# Patient Record
(demographics unavailable — no encounter records)

---

## 2024-10-10 NOTE — HISTORY OF PRESENT ILLNESS
[FreeTextEntry1] : 76 yo male with BPH and lower urinary tract symptoms. He previously went to the ED for urinary retention s/p roberts placement. He is currently on alpha blockade and 5 alpha reductase inhibitors. PSA 2/2023 - 2.27  Office visit 4/1/24: Pt previously had UDS performed on 3/2024 which showed that he was unable to void with peak detrusor of 45cm of water. W intermediate complicance. Bladder neck did not open. Small diverticulae noted. Pt w no new acute complaints. Roberts draining clear urine.  Clinic visit 4/29/24: Taking Flomax and finasteride No issues with Roberts catheter. Has complaints of decreased mobility and difficulty ambulating since January despite working with PT and OT. He ambulates in clinic with a cane today.  Office visit 5/31/24 Pt presents today to reassess his symptoms. Has been taking Flomax and finasteride daily. Foly catheter has been draining clear yellow urine. The patient denies having any fevers, chills, nausea, vomiting, flank/abdominal pain or any irritative voiding symptoms.  Office visit 8/16/24 Pt is s/p TURP on 8/13/24. He was discharged POD1 with roberts catheter in place. Pt has been taking ppx abx as prescribed. Roberts draining clear yellow urine. Denies any fevers or chills. Pathology - Prostate chip pathology - BPH with glandular and fibromuscular hyperplasia and foci of chronic prostatitis, 17g resected  Office Visit 08/30/2024 Pt reports that he went into urinary retention 24hrs after his roberts was removed two weeks ago and had to go to ER to have his roberts replaced. Prior to his Roberts being replaced he reports voiding at least on 3 separate occasions. However when he fell asleep later in the day and woke up he was found to be in retention and called the emergency hotline when he was taken to the ER for Roberts placement overnight. Today patient's Roberts catheter was actively filled with sterile saline until he had the urge to void after which the catheter was removed and patient subsequently voided with a PVR of 0 cc.  Office Visit 10/10/2024  Pt reports no new complaints.  He is very happy since he has been catheter free for over 6 weeks now voiding freely.  Does have some intermittent symptoms of urinary urgency and urge incontinence but he wears a diaper for this and it is not bothersome.  Denies any symptoms of fevers dysuria or gross hematuria.

## 2024-10-10 NOTE — ASSESSMENT
[FreeTextEntry1] : #BPH/LUTS - urinary urgency- UDS showed bladder contractility w peak pressure of 45cm of water, pt could not void, bladder neck did not funnel open. Now S/p TURP on 8/13/24. Explained to patient that given his equivocal UDS findings and hx of stroke, there is a chance that despite having a TURP procedure to relieve his bladder outlet obstruction he may still be unable to void given his poor detrusor activity/possible NGB. Given patients neurologic deficits, he is not a candidate for self-intermittent catheterization. If he goes into urinary retention again we will forgo any further TOVs and plan for repeat UDS testing. - C/w Flomax - C/w Finasteride - Bladder US in 3 months to assess PVR, pt was unable to void today  Discussed behavioral modifications as previously documented. Also discussed medications adverse effects: 1) Alpha blocker - SE's include light headedness, orthostasis, reduction in volume of ejaculation, floppy iris syndrome 2) 5ARI - SE's include gynecomastia, erectile dysfunction, hypothetical risk of high grade CaP

## 2024-10-10 NOTE — PLAN

## 2024-10-11 NOTE — PHYSICAL EXAM
[No Acute Distress] : no acute distress [Normal Sclera/Conjunctiva] : normal sclera/conjunctiva [PERRL] : pupils equal round and reactive to light [Normal Outer Ear/Nose] : the outer ears and nose were normal in appearance [No Respiratory Distress] : no respiratory distress  [No Accessory Muscle Use] : no accessory muscle use [Clear to Auscultation] : lungs were clear to auscultation bilaterally [Normal Rate] : normal rate  [Regular Rhythm] : with a regular rhythm [Normal S1, S2] : normal S1 and S2 [Soft] : abdomen soft [Non Tender] : non-tender [Non-distended] : non-distended [Normal Bowel Sounds] : normal bowel sounds [No CVA Tenderness] : no CVA  tenderness [No Spinal Tenderness] : no spinal tenderness [Normal] : heart rate was normal and rhythm regular, normal S1 and S2, no murmurs [Bowel Sounds] : normal bowel sounds [Abdomen Tenderness] : non-tender [No Masses] : no abdominal mass palpated [Abdomen Soft] : soft [Oriented To Time, Place, And Person] : oriented to person, place, and time [de-identified] : l sided hemiparesis 2/2 lacunar stroke

## 2024-10-11 NOTE — ASSESSMENT
[FreeTextEntry1] : 76 yo M with pmhx of l/lacunar stroke (2012 with residual l sided hemiparesis), HTN, pre-DM, HLD, and chronic urinary retention (chronic Ellington) s/p cystoscopy/TURP presented to the clinic to establish care.  #Unexplained weight loss # Loss of appetite - schedule for EGD and colonoscopy - Risks and benefits reviewed and procedure explained to the patient. -  liquid diet 1 day prior  - Start drinking golytely at 2pm on the day prior to the procedure - 4 tabs of Dulcolax after goltelty on the day prior to the procedure - NPO after midnight  IF these procedures were unrevealing of the etiology, consider obtaining gastric emptying study.

## 2024-10-11 NOTE — REVIEW OF SYSTEMS
[Muscle Weakness] : muscle weakness [As Noted in HPI] : as noted in HPI [Limb Weakness] : limb weakness [Negative] : Endocrine [Diarrhea] : no diarrhea [Melena (black stool)] : no melena [Fecal Incontinence (soiling)] : no fecal incontinence [Bloating (gassiness)] : no bloating [FreeTextEntry7] : occasional constipation [de-identified] : Left sided weakness [Fever] : no fever [Chills] : no chills [Night Sweats] : no night sweats [Discharge] : no discharge [Pain] : no pain [Vision Problems] : no vision problems [Itching] : no itching [Chest Pain] : no chest pain [Palpitations] : no palpitations [Shortness Of Breath] : no shortness of breath [Wheezing] : no wheezing [Cough] : no cough [Abdominal Pain] : no abdominal pain [Nausea] : no nausea [Constipation] : no constipation [Vomiting] : no vomiting [Heartburn] : no heartburn [Melena] : no melena [Dysuria] : no dysuria [Incontinence] : no incontinence [Hematuria] : no hematuria [Frequency] : no frequency [Joint Pain] : no joint pain [Muscle Pain] : no muscle pain [Back Pain] : no back pain [Itching] : no itching

## 2024-11-20 NOTE — ASSESSMENT
[FreeTextEntry1] : - Pt seen and evaluated - nails debrided x10 with sterile nail ni-ppers - subungual hematoma left hallux, appears stable, will dissolve spontaneously  - RTC in 3 months for nail care or PRN

## 2024-11-20 NOTE — HISTORY OF PRESENT ILLNESS
[Wheelchair] : a wheelchair [FreeTextEntry1] : Home aid discovered a hematoma underneath the hallux, left foot about 3 months ago, not painful  B/L LE weakness Difficulty walking

## 2024-11-20 NOTE — PHYSICAL EXAM
[Ankle Swelling Bilaterally] : bilaterally  [1+] : left foot dorsalis pedis 1+ [Pes Planus] : pes planus deformity [Vibration Dec.] : diminished vibratory sensation at the level of the toes [Oriented To Time, Place, And Person] : oriented to person, place, and time [Ankle Swelling (On Exam)] : not present [Varicose Veins Of Lower Extremities] : not present [] : not present [FreeTextEntry1] : Elongated nails, subungual hematoma, Left hallux  [Diminished Throughout Right Foot] : normal sensation with monofilament testing throughout right foot [Diminished Throughout Left Foot] : normal sensation with monofilament testing throughout left foot

## 2024-11-26 NOTE — PHYSICAL EXAM
[FreeTextEntry1] : NEURO:  MENTAL STATUS: AAOx3  LANG/SPEECH: Fluent, intact naming, repetition & comprehension  CRANIAL NERVES:  II: Pupils equal and reactive, no RAPD, Intact visual field  III, IV, VI: mild restriction on vertical gaze on the R eye, no gaze preference or deviation  V: intact V1-2-3 distribution.  VII: no facial asymmetry  VIII: intact hearing to speech  MOTOR: Neck flexion 4-, neck extension 4+, b/l shoulder abduction/adduction/flexion 2+, R biceps 4+, left biceps 3/5, triceps 4/5 b/l, bl wrist flexion/extension 4/4. 4+/5 b/l knee Flexion/Extension. 3/5 R dorsiflexion, 4+/5 R plantar flexion. 4+ dorsi/plantar flexion on the L. 4- b/l Hip flexion. No bradykinesia (finger tap, alternating movements). Normal tone, no increased rigidity.  Diffuse muscle atrophy.  REFLEXES: 2+ b/l upper extremities. Hubbard neg b/l. Brisk patellar on the R 2+, 3+ patellar on the L  SENSORY: intact to touch in all extremities  COORD: intact finger to nose, no tremor, no dysmetria.

## 2024-11-26 NOTE — ASSESSMENT
[FreeTextEntry1] : 76 y/o male with a PMHx of L lacunar stroke (2012 w/ mild residual R sided hemiparesis), HTN, Pre-DM, HLD, chronic urinary retention (chronic Ell[place]), s/p prostate surgery w subacute onset of predominantly b/l shoulder gird weakness and muscle atrophy.  Imaging and bloodwork has ruled out structural changes/compression at cervical spine, however diffuse atrophy and severe deconditioning in addition to additional chronic b/l basal ganglia lacunar infarcts.   PLAN: - c/w OT - initiate PT - encouraged exercise and sufficient food intake - c/w ASA, Lipitor, anti-hypertensives for stroke prevention - encouraged attention to salt intake to avoid hypertension - f/u in 3 months

## 2024-11-26 NOTE — HISTORY OF PRESENT ILLNESS
[FreeTextEntry1] : Follow up visit from September, patient has begun working with OT which he feels helps, and will begin working with PT next month.  He has not had much appetite and has not been eating sufficiently; this has been the case since his weakness began last April.  Has lost a great deal of muscle mass as a result.  He had also stopped doing any exercise around onset of worsening weakness, accelerating muscle loss and causing severe deconditioning.  As recommended on previous visit, he underwent MRI Brain (no acute pathology, bilateral multiple chronic basal ganglia lacunar infarcts), MR C-spine unremarkable.  Labs unremarkable (CPK, aldolase, paraneoplastic panel, myopathy panel); NCS/EMG showing only mild R carpal tunnel syndrome and R ulnar neuropathy compression at R Guyon's canal.  Prostate pathology benign.  Background: 74 y/o male with a PMHx of L lacunar stroke (2012 w/residual L sided hemiparesis), HTN, Pre-DM, HLD, chronic urinary retention (chronic Ellington), is here for f/u for chronic weakness mostly in his shoulder/ girdle, issues with ambulation. Patient currently undergoing oncological workup due to unexplained weight loss (20 lbs over the last 3-4 months) and since last visit (1 mo ago) he underwent prostate surgery (pathology pending). Per previous plan - Sinemet trial - didn't help with his ambulation and due to prostate surgery he wasn't able to use it more than 2 days. He has been ambulating with a motorized wheelchair while outside for the last several months, previously only used a cane. However can ambulate without assistive device at home. Denies falls. Has been taking stool softeners. Denies neck or back pain. Denies muscle twitching, muscle pain or cramping.  Previous chart note: Patient reports that he has been ambulating with a cane since 2012 due to his stroke, but was reportedly able to walk 20 blocks on his own. On Jan 2023, patient experienced a "freezing episode", walking out from a supermarket. He felt suddenly unable to take a step on his own (no weakness, or pain), an had to be helped home by bystanders. He was reportedly back to baseline the day after. Patient started doing PT after that episode and was discharged after 3 months, bc he was deemed to be at his baseline. Starting December 2023, patient started feeling progressively weaker and his ambulation abilities decreased. He feels like his balance is worse and needs to bend his body forward in order to avoid falling. Patient describes having difficulty raising both arms since last April. His writing is getting progressively illegible. Patient denied feeling rigidity, having REM sleep disturbances, visual hallucinations, orthostatics, or tremors, but he reports developing constipation for the last 2 years.

## 2024-11-26 NOTE — END OF VISIT
[] : Resident [FreeTextEntry3] : Patients imaging reviewed and has multiple chronic b/l BG infarcts Cervical spine normal Weakness proximal predominant in UE  Increased DTR in R>L LE absent in UE Possible PLS however possibilities of disuse atrophy in setting of prior strokes  Plan as above [Time Spent: ___ minutes] : I have spent [unfilled] minutes of time on the encounter which excludes teaching and separately reported services.

## 2025-01-09 NOTE — HISTORY OF PRESENT ILLNESS
[FreeTextEntry1] : 74 yo male with BPH and lower urinary tract symptoms. He previously went to the ED for urinary retention s/p roberts placement. He is currently on alpha blockade and 5 alpha reductase inhibitors. PSA 2/2023 - 2.27  Office visit 4/1/24: Pt previously had UDS performed on 3/2024 which showed that he was unable to void with peak detrusor of 45cm of water. W intermediate complicance. Bladder neck did not open. Small diverticulae noted. Pt w no new acute complaints. Roberts draining clear urine.  Clinic visit 4/29/24: Taking Flomax and finasteride No issues with Roberts catheter. Has complaints of decreased mobility and difficulty ambulating since January despite working with PT and OT. He ambulates in clinic with a cane today.  Office visit 5/31/24 Pt presents today to reassess his symptoms. Has been taking Flomax and finasteride daily. Foly catheter has been draining clear yellow urine. The patient denies having any fevers, chills, nausea, vomiting, flank/abdominal pain or any irritative voiding symptoms.  Office visit 8/16/24 Pt is s/p TURP on 8/13/24. He was discharged POD1 with roberts catheter in place. Pt has been taking ppx abx as prescribed. Roberts draining clear yellow urine. Denies any fevers or chills. Pathology - Prostate chip pathology - BPH with glandular and fibromuscular hyperplasia and foci of chronic prostatitis, 17g resected  Office Visit 08/30/2024 Pt reports that he went into urinary retention 24hrs after his roberts was removed two weeks ago and had to go to ER to have his roberts replaced. Prior to his Roberts being replaced he reports voiding at least on 3 separate occasions. However when he fell asleep later in the day and woke up he was found to be in retention and called the emergency hotline when he was taken to the ER for Roberts placement overnight. Today patient's Roberts catheter was actively filled with sterile saline until he had the urge to void after which the catheter was removed and patient subsequently voided with a PVR of 0 cc.  Office Visit 10/10/2024 Pt reports no new complaints. He is very happy since he has been catheter free for over 6 weeks now voiding freely. Does have some intermittent symptoms of urinary urgency and urge incontinence but he wears a diaper for this and it is not bothersome. Denies any symptoms of fevers dysuria or gross hematuria.  Office Visit 01/09/2025  Pt reports no new complaints. He has been voiding well without any episodes of retention or urinary straining.

## 2025-01-09 NOTE — ASSESSMENT
[FreeTextEntry1] : #BPH/LUTS - urinary urgency- UDS showed bladder contractility w peak pressure of 45cm of water, pt could not void, bladder neck did not funnel open. Explained to patient that given his equivocal UDS findings and hx of stroke, there is a chance that despite having a TURP procedure to relieve his bladder outlet obstruction he may still be unable to void given his poor detrusor activity/possible NGB. Given patients neurologic deficits, he is not a candidate for self-intermittent catheterization. Now S/p TURP on 8/13/24 If pt ever goes into urinary retention again, we will forgo any further TOVs and plan for repeat UDS testing. - C/w Flomax - C/w Finasteride - Bladder US in 3 months to assess PVR, pt was unable to void today  Discussed behavioral modifications as previously documented. Also discussed medications adverse effects: 1) Alpha blocker - SE's include light headedness, orthostasis, reduction in volume of ejaculation, floppy iris syndrome 2) 5ARI - SE's include gynecomastia, erectile dysfunction, hypothetical risk of high grade CaP.

## 2025-01-10 NOTE — HISTORY OF PRESENT ILLNESS
[FreeTextEntry1] : follow up visit  [de-identified] : 74 yo M with pmhx of l/lacunar stroke (2012 with residual l sided hemiparesis), HTN, pre-DM , HLD, and chronic urinary retention (chronic Ell[place], s/p cystoscopy/TURP) here for follow up. patient denies any new complaints today. He is here to follow up on labs and refill medications. The patient has a new finding of atherosclerosis of aorta and lung nodule.

## 2025-01-10 NOTE — REVIEW OF SYSTEMS
[Fever] : no fever [Chills] : no chills [Fatigue] : no fatigue [Pain] : no pain [Redness] : no redness [Earache] : no earache [Hearing Loss] : no hearing loss [Chest Pain] : no chest pain [Palpitations] : no palpitations [Claudication] : no  leg claudication [Shortness Of Breath] : no shortness of breath [Wheezing] : no wheezing [Abdominal Pain] : no abdominal pain [Nausea] : no nausea [Constipation] : no constipation [Dysuria] : no dysuria [Incontinence] : no incontinence [Hesitancy] : no hesitancy [Joint Pain] : no joint pain [Joint Stiffness] : no joint stiffness [Itching] : no itching [Mole Changes] : no mole changes [Headache] : no headache [Dizziness] : no dizziness [Suicidal] : not suicidal [Easy Bleeding] : no easy bleeding

## 2025-01-10 NOTE — ASSESSMENT
[FreeTextEntry1] : 76 yo M with pmhx of l/lacunar stroke (2012 with residual l sided hemiparesis), HTN, pre-DM (last A1C 5.7%), HLD, and chronic urinary retention (chronic Ellington) here for follow up s/p cystoscopy/TURP. patient has refills of his meds  # Atherosclerosis of the aorta - referred to cardiology  # Descending aorta dilatation - 4 cm dilation on CT scan - f/u US abdomen in 6 months  # Lung nodule - Few 2-3 mm nodules within the right lower lobe some of which are branching suspicious for small airway disease of infectious or inflammatory etiology.  # BPH/LUTS/Retention, improved -s/p cystoscopy/TURP -c/w finasteride and flomax -follows with urology  #Progressive weakness and debilitation #weight loss -had trial of Sinemet previously -pending - MRI Brain w/wo- MRI C-spine w/wo  #HTN -/92 today -Continue with amlodipine and losartan  #HLD -Continue with atorvastatin  #  Functional urinary incontinence: Continue using diapers, PRN  #Pre-DM -Most recent A1C 5.7 -Educated on balanced diet and reduction of sweet food intake  #CKD -baseline cr 1.2 Will continue to monitor CMP  #HCM -flu vaccine given today -colon cancer screening: colonoscopy scheduled in march 2025 RTC in4 months

## 2025-01-10 NOTE — PHYSICAL EXAM
[No Acute Distress] : no acute distress [Well Nourished] : well nourished [Well Developed] : well developed [Normal Sclera/Conjunctiva] : normal sclera/conjunctiva [Normal Outer Ear/Nose] : the outer ears and nose were normal in appearance [Normal Oropharynx] : the oropharynx was normal [No JVD] : no jugular venous distention [No Lymphadenopathy] : no lymphadenopathy [Supple] : supple [No Respiratory Distress] : no respiratory distress  [No Accessory Muscle Use] : no accessory muscle use [Clear to Auscultation] : lungs were clear to auscultation bilaterally [Normal Rate] : normal rate  [Regular Rhythm] : with a regular rhythm [Normal S1, S2] : normal S1 and S2 [No Abdominal Bruit] : a ~M bruit was not heard ~T in the abdomen [Soft] : abdomen soft [Non Tender] : non-tender [Non-distended] : non-distended [No Masses] : no abdominal mass palpated [Normal Anterior Cervical Nodes] : no anterior cervical lymphadenopathy [No CVA Tenderness] : no CVA  tenderness [No Spinal Tenderness] : no spinal tenderness [No Joint Swelling] : no joint swelling [No Rash] : no rash [de-identified] : Left sided hemiparesis, wheelchair boud.

## 2025-01-24 NOTE — REVIEW OF SYSTEMS
[Fever] : no fever [Chills] : no chills [Fatigue] : no fatigue [Chest Pain] : no chest pain [Palpitations] : no palpitations [Claudication] : no  leg claudication [Shortness Of Breath] : no shortness of breath [Wheezing] : no wheezing [Abdominal Pain] : no abdominal pain [Nausea] : no nausea [Constipation] : no constipation [Dysuria] : no dysuria [Incontinence] : no incontinence [Hesitancy] : no hesitancy [Joint Pain] : no joint pain [Joint Stiffness] : no joint stiffness [Itching] : no itching [Mole Changes] : no mole changes [Headache] : no headache [Dizziness] : no dizziness [Suicidal] : not suicidal [Easy Bleeding] : no easy bleeding [FreeTextEntry4] : soft tissue swelling at the top of the head, some pain

## 2025-01-24 NOTE — PHYSICAL EXAM
[No Acute Distress] : no acute distress [Well Nourished] : well nourished [Well Developed] : well developed [Normal Sclera/Conjunctiva] : normal sclera/conjunctiva [No JVD] : no jugular venous distention [No Lymphadenopathy] : no lymphadenopathy [Supple] : supple [No Respiratory Distress] : no respiratory distress  [No Accessory Muscle Use] : no accessory muscle use [Clear to Auscultation] : lungs were clear to auscultation bilaterally [Normal Rate] : normal rate  [Regular Rhythm] : with a regular rhythm [Normal S1, S2] : normal S1 and S2 [No Abdominal Bruit] : a ~M bruit was not heard ~T in the abdomen [Soft] : abdomen soft [Non Tender] : non-tender [Non-distended] : non-distended [No Masses] : no abdominal mass palpated [Normal Anterior Cervical Nodes] : no anterior cervical lymphadenopathy [No CVA Tenderness] : no CVA  tenderness [No Spinal Tenderness] : no spinal tenderness [No Rash] : no rash [de-identified] : on wheelchair [de-identified] : soft tissue swelling with bruising over top of the head [de-identified] : Left sided hemiparesis, wheelchair boud.

## 2025-01-24 NOTE — ASSESSMENT
[FreeTextEntry1] : 76 yo M with pmhx of l/lacunar stroke (2012 with residual l sided hemiparesis), HTN, pre-DM , HLD, and chronic urinary retention (chronic Ell[place], s/p cystoscopy/TURP) here after a fall on 1/19. He went to [facility] where he had a CT scan of the head which was negative. He reports that this was a mechanical fall where he slipped and fell and hit the back of his head.   #Mechanical fall - denies LOC - s/p evaluation in [facility], with negative CTH - local wound care for scalp trauma - continue with physical therapy - Pt is back to baseline. Denies any dizziness or headaches.   # BPH/LUTS/Retention, improved -s/p cystoscopy/TURP -c/w finasteride and flomax -follows with urology  #HTN - elevated today. Did not take his meds.  -Continue with amlodipine and losartan  #HLD -Continue with atorvastatin  #  Functional urinary incontinence: Continue using diapers, PRN  #Pre-DM -Most recent A1C 5.7 -Educated on balanced diet and reduction of sweet food intake  #CKD -baseline cr 1.2 Will continue to monitor CMP  #HCM -flu vaccine given today -colon cancer screening: colonoscopy scheduled in March 2025 RTC in 4 months